# Patient Record
Sex: MALE | Race: WHITE | Employment: FULL TIME | URBAN - METROPOLITAN AREA
[De-identification: names, ages, dates, MRNs, and addresses within clinical notes are randomized per-mention and may not be internally consistent; named-entity substitution may affect disease eponyms.]

---

## 2020-01-28 ENCOUNTER — HOSPITAL ENCOUNTER (EMERGENCY)
Facility: CLINIC | Age: 30
Discharge: HOME | End: 2020-01-28
Attending: EMERGENCY MEDICINE | Admitting: EMERGENCY MEDICINE
Payer: COMMERCIAL

## 2020-01-28 VITALS
TEMPERATURE: 99.2 F | BODY MASS INDEX: 20.04 KG/M2 | HEIGHT: 70 IN | OXYGEN SATURATION: 98 % | HEART RATE: 86 BPM | DIASTOLIC BLOOD PRESSURE: 77 MMHG | SYSTOLIC BLOOD PRESSURE: 119 MMHG | WEIGHT: 140 LBS | RESPIRATION RATE: 16 BRPM

## 2020-01-28 DIAGNOSIS — J35.1 SWELLING OF TONSIL: ICD-10-CM

## 2020-01-28 DIAGNOSIS — J02.9 SORE THROAT: ICD-10-CM

## 2020-01-28 LAB
C TRACH DNA SPEC QL NAA+PROBE: NEGATIVE
DEPRECATED S PYO AG THROAT QL EIA: NORMAL
N GONORRHOEA DNA SPEC QL NAA+PROBE: NEGATIVE
SPECIMEN SOURCE: NORMAL

## 2020-01-28 PROCEDURE — 25000132 ZZH RX MED GY IP 250 OP 250 PS 637: Performed by: EMERGENCY MEDICINE

## 2020-01-28 PROCEDURE — 96374 THER/PROPH/DIAG INJ IV PUSH: CPT

## 2020-01-28 PROCEDURE — 99284 EMERGENCY DEPT VISIT MOD MDM: CPT

## 2020-01-28 PROCEDURE — 25000128 H RX IP 250 OP 636: Performed by: EMERGENCY MEDICINE

## 2020-01-28 PROCEDURE — 87591 N.GONORRHOEAE DNA AMP PROB: CPT | Performed by: EMERGENCY MEDICINE

## 2020-01-28 PROCEDURE — 96372 THER/PROPH/DIAG INJ SC/IM: CPT | Mod: 59

## 2020-01-28 PROCEDURE — 87081 CULTURE SCREEN ONLY: CPT | Performed by: EMERGENCY MEDICINE

## 2020-01-28 PROCEDURE — 87880 STREP A ASSAY W/OPTIC: CPT | Performed by: EMERGENCY MEDICINE

## 2020-01-28 PROCEDURE — 87491 CHLMYD TRACH DNA AMP PROBE: CPT | Performed by: EMERGENCY MEDICINE

## 2020-01-28 PROCEDURE — 25000125 ZZHC RX 250: Performed by: EMERGENCY MEDICINE

## 2020-01-28 RX ORDER — IBUPROFEN 600 MG/1
600 TABLET, FILM COATED ORAL ONCE
Status: COMPLETED | OUTPATIENT
Start: 2020-01-28 | End: 2020-01-28

## 2020-01-28 RX ORDER — ACETAMINOPHEN 325 MG/1
650 TABLET ORAL ONCE
Status: COMPLETED | OUTPATIENT
Start: 2020-01-28 | End: 2020-01-28

## 2020-01-28 RX ORDER — DEXAMETHASONE SODIUM PHOSPHATE 10 MG/ML
10 INJECTION, SOLUTION INTRAMUSCULAR; INTRAVENOUS ONCE
Status: COMPLETED | OUTPATIENT
Start: 2020-01-28 | End: 2020-01-28

## 2020-01-28 RX ORDER — DOXYCYCLINE 100 MG/1
100 CAPSULE ORAL 2 TIMES DAILY
Qty: 14 CAPSULE | Refills: 0 | Status: SHIPPED | OUTPATIENT
Start: 2020-01-28 | End: 2020-02-04

## 2020-01-28 RX ADMIN — IBUPROFEN 600 MG: 600 TABLET ORAL at 01:25

## 2020-01-28 RX ADMIN — DEXAMETHASONE SODIUM PHOSPHATE 10 MG: 10 INJECTION, SOLUTION INTRAMUSCULAR; INTRAVENOUS at 01:24

## 2020-01-28 RX ADMIN — LIDOCAINE HYDROCHLORIDE 250 MG: 10 INJECTION, SOLUTION EPIDURAL; INFILTRATION; INTRACAUDAL; PERINEURAL at 01:35

## 2020-01-28 RX ADMIN — ACETAMINOPHEN 650 MG: 325 TABLET, FILM COATED ORAL at 01:23

## 2020-01-28 ASSESSMENT — ENCOUNTER SYMPTOMS
COUGH: 1
FEVER: 1
SORE THROAT: 1

## 2020-01-28 ASSESSMENT — MIFFLIN-ST. JEOR: SCORE: 1611.29

## 2020-01-28 NOTE — ED PROVIDER NOTES
"  History     Chief Complaint:  Pharyngitis     HPI   Puma Kinney is a 29 year old male who presents to the emergency department for evaluation of pharyngitis. The patient reports he has experienced around 5 days of sore throat, left greater than right, with the sensation of left-sided throat swelling as well. He presents to the ED out of concern for the persistence of his symptoms. He notes he had a fever 2 days ago, but this has since improved. The patient further reports some hemoptysis several days ago as well, though this also improved. He remarks his left tonsils are more swollen than normal, but they are swollen at baseline since 2018 following an episode of mononucleosis. The patient also states he has concern for possible STD; he is currently sexually active with males, with oral intercourse, though he denies any known STDs in his sexual partners.    Allergies:  Amoxicillin     Medications:    The patient is currently on no regular medications.    Past Medical History:    The patient denies any significant past medical history.    Past Surgical History:    The patient does not have any pertinent past surgical history.     Family History:    No past pertinent family history.    Social History:  Presents alone.    Review of Systems   Constitutional: Positive for fever (resolved).   HENT: Positive for sore throat.    Respiratory: Positive for cough.    All other systems reviewed and are negative.    Physical Exam     Patient Vitals for the past 24 hrs:   BP Temp Temp src Pulse Heart Rate Resp SpO2 Height Weight   01/28/20 0138 119/77 -- -- 86 -- -- 98 % -- --   01/28/20 0012 125/73 99.2  F (37.3  C) Oral -- 98 16 98 % 1.778 m (5' 10\") 63.5 kg (140 lb)     Physical Exam  General: Alert, appears well-developed and well-nourished. Cooperative.     In mild distress  HEENT:  Head:  Atraumatic  Ears:  External ears are normal  Mouth/Throat:  Oropharynx with left tonsillar swelling with mild bleeding and purulent " exudate.  No significant uvular deviation.  No obvious clinical presence of peritonsillar abscess on exam.  Eyes:   Conjunctivae normal and EOM are normal. No scleral icterus.  Neck:  No meningismus, full range of motion.  CV:  Normal rate, regular rhythm, normal heart sounds and radial pulses are 2+ and symmetric.  No murmur.  Resp:  Breath sounds are clear bilaterally    Non-labored, no retractions or accessory muscle use  GI:  Abdomen is soft, no distension, no tenderness. No rebound or guarding.  No CVA tenderness bilaterally  MS:  Normal range of motion. No edema.    Normal strength in all 4 extremities.     Back atraumatic.    No midline cervical, thoracic, or lumbar tenderness  Skin:  Warm and dry.  No rash or lesions noted.  Neuro: Alert. Normal strength.  GCS: 15  Psych:  Normal mood and affect.  Lymph: Anterior cervical lymphadenopathy noted.    Emergency Department Course     Laboratory:  Laboratory findings were communicated with the patient who voiced understanding of the findings.    Rapid strep screen: Negative    Beta strep group A culture pending  Chlamydia trachomatis PCR pending.  Neisseria gonorrheae PCR pending.    Interventions:  0123 Tylenol 650 mg PO  0124 Decadron 10 mg IV  0125 Ibuprofen 600 mg PO  0135 Rocephin 250 mg IM    Emergency Department Course:  Past medical records, nursing notes, and vitals reviewed.    0056 I performed an exam of the patient as documented above.     The patient was swabbed. This was sent to the lab for further testing, results above.    0016 I rechecked the patient and discussed the results of his workup thus far.     Findings and plan explained to the Patient. Patient discharged home with instructions regarding supportive care, medications, and reasons to return. The importance of close follow-up was reviewed. The patient was prescribed Vibramycin.    I personally reviewed the laboratory results with the Patient and answered all related questions prior to  discharge.     Impression & Plan     Medical Decision Making:  Puma Kinney is a 29 year old male who presents with sore throat.  Patient does have some mild bleeding and purulent exudate to the left tonsil.  Strep swab negative.  Patient does have oral sex with men and was concerned for potential gonorrhea and/or chlamydia exposure.  He was swabbed for both oral gonorrhea and/or chlamydia, and swabs are pending at time of disposition.  He was treated for suspected STD exposure with both ceftriaxone and a course of doxycycline.  A strep swab was negative. There is still the potential for other bacterial causes of sore throat and left tonsillar swelling.  Doxycycline should provide appropriate coverage for other atypical bacterial sore throat infections.  He was given a dose of Decadron here for tonsillar swelling and discomfort.  Close follow-up with ENT indicated in 3 days for recheck if symptoms persist or worsen.  No signs of RPA or PTA at this time.  Return precautions understood and all questions answered for discharge.  Discharged home.      Diagnosis:    ICD-10-CM    1. Sore throat J02.9 Beta strep group A culture     Chlamydia trachomatis PCR     Neisseria gonorrhoeae PCR    Possible STD exposure   2. Swelling of tonsil J35.1     left       Disposition:  Discharged to home.    Discharge Medications:  Discharge Medication List as of 1/28/2020  1:29 AM      START taking these medications    Details   doxycycline hyclate (VIBRAMYCIN) 100 MG capsule Take 1 capsule (100 mg) by mouth 2 times daily for 7 days, Disp-14 capsule, R-0, Local Print           Scribe Disclosure:  Sukhdeep AGUILAR, am serving as a scribe at 12:42 AM on 1/28/2020 to document services personally performed by Akash Quiñones MD based on my observations and the provider's statements to me.      Akash Quiñones MD  01/28/20 9570

## 2020-01-28 NOTE — ED AVS SNAPSHOT
Emergency Department  [address] [facility] [address]  Phone:  [phone]  Fax:  [phone]                                    Puma Kinney   MRN: [id number]    Department:   Emergency Department   Date of Visit:  1/28/2020           After Visit Summary Signature Page    I have received my discharge instructions, and my questions have been answered. I have discussed any challenges I see with this plan with the nurse or doctor.    ..........................................................................................................................................  Patient/Patient Representative Signature      ..........................................................................................................................................  Patient Representative Print Name and Relationship to Patient    ..................................................               ................................................  Date                                   Time    ..........................................................................................................................................  Reviewed by Signature/Title    ...................................................              ..............................................  Date                                               Time          22EPIC Rev 08/18

## 2020-01-30 LAB
BACTERIA SPEC CULT: NORMAL
SPECIMEN SOURCE: NORMAL

## 2020-01-30 NOTE — RESULT ENCOUNTER NOTE
Final Beta strep group A r/o culture is NEGATIVE for Group A streptococcus.    No treatment or change in treatment per [facility] Strep protocol.

## 2020-03-11 ENCOUNTER — HEALTH MAINTENANCE LETTER (OUTPATIENT)
Age: 30
End: 2020-03-11

## 2021-01-04 ENCOUNTER — HEALTH MAINTENANCE LETTER (OUTPATIENT)
Age: 31
End: 2021-01-04

## 2021-04-25 ENCOUNTER — HEALTH MAINTENANCE LETTER (OUTPATIENT)
Age: 31
End: 2021-04-25

## 2021-10-10 ENCOUNTER — HEALTH MAINTENANCE LETTER (OUTPATIENT)
Age: 31
End: 2021-10-10

## 2022-05-22 ENCOUNTER — HEALTH MAINTENANCE LETTER (OUTPATIENT)
Age: 32
End: 2022-05-22

## 2022-09-18 ENCOUNTER — HEALTH MAINTENANCE LETTER (OUTPATIENT)
Age: 32
End: 2022-09-18

## 2023-06-04 ENCOUNTER — HEALTH MAINTENANCE LETTER (OUTPATIENT)
Age: 33
End: 2023-06-04